# Patient Record
Sex: MALE | Race: WHITE | URBAN - METROPOLITAN AREA
[De-identification: names, ages, dates, MRNs, and addresses within clinical notes are randomized per-mention and may not be internally consistent; named-entity substitution may affect disease eponyms.]

---

## 2022-12-21 ENCOUNTER — APPOINTMENT (RX ONLY)
Dept: URBAN - METROPOLITAN AREA CLINIC 6 | Facility: CLINIC | Age: 52
Setting detail: DERMATOLOGY
End: 2022-12-21

## 2022-12-21 DIAGNOSIS — L82.1 OTHER SEBORRHEIC KERATOSIS: ICD-10-CM

## 2022-12-21 DIAGNOSIS — L91.8 OTHER HYPERTROPHIC DISORDERS OF THE SKIN: ICD-10-CM

## 2022-12-21 DIAGNOSIS — L82.0 INFLAMED SEBORRHEIC KERATOSIS: ICD-10-CM

## 2022-12-21 PROCEDURE — ? COUNSELING

## 2022-12-21 PROCEDURE — ? LIQUID NITROGEN

## 2022-12-21 PROCEDURE — 17110 DESTRUCTION B9 LES UP TO 14: CPT

## 2022-12-21 PROCEDURE — 99202 OFFICE O/P NEW SF 15 MIN: CPT | Mod: 25

## 2022-12-21 ASSESSMENT — LOCATION ZONE DERM
LOCATION ZONE: FACE
LOCATION ZONE: SCALP
LOCATION ZONE: EYELID

## 2022-12-21 ASSESSMENT — LOCATION SIMPLE DESCRIPTION DERM
LOCATION SIMPLE: LEFT SUPERIOR EYELID
LOCATION SIMPLE: SCALP
LOCATION SIMPLE: LEFT CHEEK
LOCATION SIMPLE: SUPERIOR FOREHEAD

## 2022-12-21 ASSESSMENT — LOCATION DETAILED DESCRIPTION DERM
LOCATION DETAILED: LEFT LATERAL SUPERIOR EYELID
LOCATION DETAILED: SUPERIOR MID FOREHEAD
LOCATION DETAILED: LEFT CENTRAL MALAR CHEEK
LOCATION DETAILED: LEFT INFERIOR POSTAURICULAR SKIN

## 2022-12-21 NOTE — PROCEDURE: LIQUID NITROGEN
Spray Paint Text: The liquid nitrogen was applied to the skin utilizing a spray paint frosting technique.
Render Note In Bullet Format When Appropriate: No
Medical Necessity Clause: This procedure was medically necessary because the lesions that were treated were:
Medical Necessity Information: It is in your best interest to select a reason for this procedure from the list below. All of these items fulfill various CMS LCD requirements except the new and changing color options.
Consent: The patient's consent was obtained including but not limited to risks of crusting, scabbing, blistering, scarring, darker or lighter pigmentary change, recurrence, incomplete removal and infection.
Post-Care Instructions: I reviewed with the patient in detail post-care instructions. Patient is to wear sunprotection, and avoid picking at any of the treated lesions. Pt may apply Vaseline to crusted or scabbing areas.
Show Topical Anesthesia Variable?: Yes
Detail Level: Detailed
Number Of Freeze-Thaw Cycles: 3 freeze-thaw cycles

## 2023-07-14 ENCOUNTER — APPOINTMENT (OUTPATIENT)
Dept: URGENT CARE | Facility: PHYSICIAN GROUP | Age: 53
End: 2023-07-14
Payer: COMMERCIAL

## 2023-07-14 ENCOUNTER — OFFICE VISIT (OUTPATIENT)
Dept: URGENT CARE | Facility: PHYSICIAN GROUP | Age: 53
End: 2023-07-14
Payer: COMMERCIAL

## 2023-07-14 VITALS
TEMPERATURE: 98.1 F | HEART RATE: 80 BPM | HEIGHT: 69 IN | SYSTOLIC BLOOD PRESSURE: 136 MMHG | DIASTOLIC BLOOD PRESSURE: 82 MMHG | WEIGHT: 185 LBS | RESPIRATION RATE: 16 BRPM | OXYGEN SATURATION: 97 % | BODY MASS INDEX: 27.4 KG/M2

## 2023-07-14 DIAGNOSIS — S86.002A INJURY OF LEFT ACHILLES TENDON, INITIAL ENCOUNTER: ICD-10-CM

## 2023-07-14 PROCEDURE — 99213 OFFICE O/P EST LOW 20 MIN: CPT | Performed by: NURSE PRACTITIONER

## 2023-07-14 PROCEDURE — 3075F SYST BP GE 130 - 139MM HG: CPT | Performed by: NURSE PRACTITIONER

## 2023-07-14 PROCEDURE — 3079F DIAST BP 80-89 MM HG: CPT | Performed by: NURSE PRACTITIONER

## 2023-07-14 RX ORDER — FLUTICASONE FUROATE AND VILANTEROL 200; 25 UG/1; UG/1
1 POWDER RESPIRATORY (INHALATION)
COMMUNITY
Start: 2023-06-12

## 2023-07-14 RX ORDER — MONTELUKAST SODIUM 10 MG/1
10 TABLET ORAL
COMMUNITY
Start: 2023-06-09

## 2023-07-14 ASSESSMENT — ENCOUNTER SYMPTOMS
MUSCULOSKELETAL NEGATIVE: 1
RESPIRATORY NEGATIVE: 1
CONSTITUTIONAL NEGATIVE: 1
NEUROLOGICAL NEGATIVE: 1
EYES NEGATIVE: 1
GASTROINTESTINAL NEGATIVE: 1
CARDIOVASCULAR NEGATIVE: 1

## 2023-07-14 NOTE — PROGRESS NOTES
"Subjective:   Hosea Noble is a 53 y.o. male who presents for Ankle Injury (Tripped and injured L ankle last night.)      Patient presents with acute left ankle pain and swelling s/p twisting injury while playing with his dog last night.  He states he heard and felt a pop near the insertion of his Achilles tendon and has been unable to bear weight since that time.  He denies numbness or tingling.  He reports radiation of pain up to his calf.         Review of Systems   Constitutional: Negative.    HENT: Negative.     Eyes: Negative.    Respiratory: Negative.     Cardiovascular: Negative.    Gastrointestinal: Negative.    Genitourinary: Negative.    Musculoskeletal: Negative.    Skin: Negative.    Neurological: Negative.        Medications, Allergies, and current problem list reviewed today in Epic.     Objective:     /82   Pulse 80   Temp 36.7 °C (98.1 °F)   Resp 16   Ht 1.753 m (5' 9\")   Wt 83.9 kg (185 lb)   SpO2 97%     Physical Exam  Vitals reviewed.   Constitutional:       Appearance: Normal appearance.   HENT:      Head: Normocephalic and atraumatic.      Nose: Nose normal.      Mouth/Throat:      Mouth: Mucous membranes are moist.      Pharynx: Oropharynx is clear.   Eyes:      Extraocular Movements: Extraocular movements intact.      Conjunctiva/sclera: Conjunctivae normal.      Pupils: Pupils are equal, round, and reactive to light.   Cardiovascular:      Rate and Rhythm: Normal rate and regular rhythm.      Pulses: Normal pulses.      Heart sounds: Normal heart sounds.   Pulmonary:      Effort: Pulmonary effort is normal.      Breath sounds: Normal breath sounds.   Abdominal:      General: Abdomen is flat. Bowel sounds are normal.      Palpations: Abdomen is soft.   Musculoskeletal:      Cervical back: Normal range of motion and neck supple.      Left ankle: Swelling and deformity present. Tenderness present. Decreased range of motion.      Left Achilles Tendon: Tenderness present. Caldwell's " test positive.      Comments: There is swelling and pain to the insertion of the Achilles tendon.  +Caldwell test.  Pain to palpation of the calf muscle with squeezing.  Unable to bear weight.  Neurovascular status intact.  Exam limited due to pain.    Skin:     General: Skin is warm and dry.      Capillary Refill: Capillary refill takes less than 2 seconds.   Neurological:      General: No focal deficit present.      Mental Status: He is alert and oriented to person, place, and time.   Psychiatric:         Mood and Affect: Mood normal.         Behavior: Behavior normal.         Assessment/Plan:     Diagnosis and associated orders:     1. Injury of left Achilles tendon, initial encounter           Comments/MDM:     Suspect patient has an acute partial or complete Achilles tendon rupture. Patient was referred to [facility] this morning, for more appropriate diagnostics and treatment.  He was given crutches for ambulation.  Ankle stabilization was deferred to [facility] as patient will be going there directly from here.  Patient verbalized understanding and is in agreement with this plan.          Differential diagnosis, natural history, supportive care, and indications for immediate follow-up discussed.    Advised the patient to follow-up with the primary care physician for recheck, reevaluation, and consideration of further management.    Please note that this dictation was created using voice recognition software. I have made a reasonable attempt to correct obvious errors, but I expect that there are errors of grammar and possibly content that I did not discover before finalizing the note.    This note was electronically signed by SHYANN Chiu

## 2023-11-01 ENCOUNTER — HOSPITAL ENCOUNTER (OUTPATIENT)
Facility: MEDICAL CENTER | Age: 53
End: 2023-11-01
Attending: PHYSICIAN ASSISTANT
Payer: COMMERCIAL

## 2023-11-01 LAB — PSA SERPL-MCNC: 3.16 NG/ML (ref 0–4)

## 2023-11-01 PROCEDURE — 84270 ASSAY OF SEX HORMONE GLOBUL: CPT

## 2023-11-01 PROCEDURE — 84402 ASSAY OF FREE TESTOSTERONE: CPT

## 2023-11-01 PROCEDURE — 84153 ASSAY OF PSA TOTAL: CPT

## 2023-11-01 PROCEDURE — 84403 ASSAY OF TOTAL TESTOSTERONE: CPT

## 2023-11-03 LAB
SHBG SERPL-SCNC: 12 NMOL/L (ref 19–76)
TESTOST FREE MFR SERPL: 2.7 % (ref 1.6–2.9)
TESTOST FREE SERPL-MCNC: 67 PG/ML (ref 47–244)
TESTOST SERPL-MCNC: 252 NG/DL (ref 300–890)

## 2024-06-25 ENCOUNTER — HOSPITAL ENCOUNTER (OUTPATIENT)
Dept: LAB | Facility: MEDICAL CENTER | Age: 54
End: 2024-06-25
Attending: PHYSICIAN ASSISTANT
Payer: COMMERCIAL

## 2024-06-25 LAB — PSA SERPL-MCNC: 2.23 NG/ML (ref 0–4)

## 2024-06-25 PROCEDURE — 84270 ASSAY OF SEX HORMONE GLOBUL: CPT

## 2024-06-25 PROCEDURE — 36415 COLL VENOUS BLD VENIPUNCTURE: CPT

## 2024-06-25 PROCEDURE — 84402 ASSAY OF FREE TESTOSTERONE: CPT

## 2024-06-25 PROCEDURE — 84403 ASSAY OF TOTAL TESTOSTERONE: CPT

## 2024-06-25 PROCEDURE — 84153 ASSAY OF PSA TOTAL: CPT

## 2024-06-27 LAB
SHBG SERPL-SCNC: 11 NMOL/L (ref 19–76)
TESTOST FREE MFR SERPL: 2.7 % (ref 1.6–2.9)
TESTOST FREE SERPL-MCNC: 39 PG/ML (ref 47–244)
TESTOST SERPL-MCNC: 148 NG/DL (ref 300–890)

## 2024-11-11 ENCOUNTER — RESEARCH ENCOUNTER (OUTPATIENT)
Dept: RESEARCH | Facility: MEDICAL CENTER | Age: 54
End: 2024-11-11

## 2024-11-11 ENCOUNTER — OFFICE VISIT (OUTPATIENT)
Dept: MEDICAL GROUP | Facility: CLINIC | Age: 54
End: 2024-11-11
Payer: COMMERCIAL

## 2024-11-11 VITALS
HEIGHT: 69 IN | TEMPERATURE: 98.5 F | WEIGHT: 210 LBS | DIASTOLIC BLOOD PRESSURE: 81 MMHG | BODY MASS INDEX: 31.1 KG/M2 | HEART RATE: 74 BPM | SYSTOLIC BLOOD PRESSURE: 138 MMHG | RESPIRATION RATE: 18 BRPM | OXYGEN SATURATION: 95 %

## 2024-11-11 DIAGNOSIS — R53.82 CHRONIC FATIGUE: ICD-10-CM

## 2024-11-11 DIAGNOSIS — Z11.4 SCREENING FOR HIV (HUMAN IMMUNODEFICIENCY VIRUS): ICD-10-CM

## 2024-11-11 DIAGNOSIS — Z23 NEED FOR VACCINATION: ICD-10-CM

## 2024-11-11 DIAGNOSIS — J45.30 MILD PERSISTENT ASTHMA WITHOUT COMPLICATION: ICD-10-CM

## 2024-11-11 DIAGNOSIS — Z87.828 HISTORY OF RUPTURE OF ACHILLES TENDON: ICD-10-CM

## 2024-11-11 DIAGNOSIS — Z00.6 RESEARCH STUDY PATIENT: ICD-10-CM

## 2024-11-11 DIAGNOSIS — E66.9 OBESITY (BMI 30-39.9): ICD-10-CM

## 2024-11-11 DIAGNOSIS — Z11.59 NEED FOR HEPATITIS C SCREENING TEST: ICD-10-CM

## 2024-11-11 DIAGNOSIS — Z00.00 HEALTHCARE MAINTENANCE: ICD-10-CM

## 2024-11-11 PROCEDURE — 99203 OFFICE O/P NEW LOW 30 MIN: CPT | Mod: 25,GE

## 2024-11-11 PROCEDURE — 3075F SYST BP GE 130 - 139MM HG: CPT

## 2024-11-11 PROCEDURE — 3079F DIAST BP 80-89 MM HG: CPT

## 2024-11-11 PROCEDURE — 90471 IMMUNIZATION ADMIN: CPT

## 2024-11-11 PROCEDURE — 90715 TDAP VACCINE 7 YRS/> IM: CPT

## 2024-11-11 RX ORDER — TESTOSTERONE GEL, 1% 10 MG/G
50 GEL TRANSDERMAL DAILY
COMMUNITY

## 2024-11-11 ASSESSMENT — PATIENT HEALTH QUESTIONNAIRE - PHQ9: CLINICAL INTERPRETATION OF PHQ2 SCORE: 0

## 2024-11-12 NOTE — RESEARCH NOTE
Patient has been referred by Mark Louis. The initial referral follow-up message, which includes the consent form link, has been sent to the patient.    Eligible Studies: HNP/MNASH

## 2024-11-12 NOTE — ASSESSMENT & PLAN NOTE
Patient reporting chronic fatigue, which he contributes to aging and testosterone insufficiency.  Followed by urology, who is prescribing testosterone gel.  He has lab orders for follow-up with them for January 2025.  Has not had other workup for chronic fatigue.  May be a component of decreased energy with recent weight gain over the past 1 year.  - Will screen for other causes of fatigue: CBC, CMP, TSH, B12/folate

## 2024-11-12 NOTE — ASSESSMENT & PLAN NOTE
Mild persistent asthma, controlled with daily Flonase, Breo inhaler, Zyrtec, Singulair.  Not requiring rescue albuterol for years.  Followed by allergy.  - Continue management per allergy, may prescribe rescue inhaler in the future if requested by patient

## 2024-11-12 NOTE — ASSESSMENT & PLAN NOTE
Achilles tendon rupture 1 year ago, followed by [facility].  Nonsurgical management.  Back to his baseline level activity.  - Continue current exercise regimen

## 2024-11-12 NOTE — PROGRESS NOTES
This note is formatted in an APSO format, for additional subjective and objective evaluation please scroll to the bottom of the note.    CC:  Chief Complaint   Patient presents with    Establish Care       Assessment/Plan:  Problem List Items Addressed This Visit       Chronic fatigue     Patient reporting chronic fatigue, which he contributes to aging and testosterone insufficiency.  Followed by urology, who is prescribing testosterone gel.  He has lab orders for follow-up with them for January 2025.  Has not had other workup for chronic fatigue.  May be a component of decreased energy with recent weight gain over the past 1 year.  - Will screen for other causes of fatigue: CBC, CMP, TSH, B12/folate         Relevant Orders    CBC WITH DIFFERENTIAL    Comp Metabolic Panel    TSH WITH REFLEX TO FT4    VIT B12,  FOLIC ACID    HEMOGLOBIN A1C    Mild persistent asthma without complication     Mild persistent asthma, controlled with daily Flonase, Breo inhaler, Zyrtec, Singulair.  Not requiring rescue albuterol for years.  Followed by allergy.  - Continue management per allergy, may prescribe rescue inhaler in the future if requested by patient         History of rupture of Achilles tendon     Achilles tendon rupture 1 year ago, followed by [facility].  Nonsurgical management.  Back to his baseline level activity.  - Continue current exercise regimen         Obesity (BMI 30-39.9)     Patient with history of obesity, increased weight gain after an Achilles injury 1 year ago.  Has tried dieting, limiting total caloric intake.  Has not tried any weight loss supplements or calorie counting.  Patient interested in Mounjaro for weight loss.  - Engaged in discussion regarding the risks and benefits of Mounjaro  - Recommended continued lifestyle interventions, including limiting caloric intake  - If no improvement in weight loss with conservative measures and OTC supplements in 3 months, may consider prescribing Mounjaro  - Lab  screening to rule out other metabolic complications: CBC, CMP, lipid profile, TSH, A1c         Relevant Orders    CBC WITH DIFFERENTIAL    Comp Metabolic Panel    TSH WITH REFLEX TO FT4    Lipid Profile    HEMOGLOBIN A1C     Other Visit Diagnoses       Screening for HIV (human immunodeficiency virus)        Relevant Orders    HIV AG/AB COMBO ASSAY SCREENING    Need for hepatitis C screening test        Relevant Orders    HEP C VIRUS ANTIBODY    Need for vaccination        Relevant Medications    Zoster Vac Recomb Adjuvanted (SHINGRIX) 50 MCG/0.5ML Recon Susp    Other Relevant Orders    Tdap Vaccine =>6YO IM (Completed)    Healthcare maintenance        Relevant Orders    Referral to Genetic Research Studies              Orders Placed This Encounter    Tdap Vaccine =>6YO IM    HIV AG/AB COMBO ASSAY SCREENING    HEP C VIRUS ANTIBODY    CBC WITH DIFFERENTIAL    Comp Metabolic Panel    TSH WITH REFLEX TO FT4    Lipid Profile    VIT B12,  FOLIC ACID    HEMOGLOBIN A1C    Referral to Genetic Research Studies    testosterone (TESTIM/ANDROGEL) 50 MG/5GM (1%) Gel gel    Zoster Vac Recomb Adjuvanted (SHINGRIX) 50 MCG/0.5ML Recon Susp       Return in about 3 months (around 2/11/2025).    No future appointments.     LABS: Results reviewed and discussed with the patient, questions answered.    HISTORY OF PRESENT ILLNESS: Patient is a 54 y.o. male established patient who presents today with:    Problem   Chronic Fatigue    11/11/2024 Pt reports chronic fatigue, followed by urology and is prescribed flomax and testosterone gel by their clinic. States his testosterone was 150 when checked by their clinic, which is why they initiated testosterone therapy. Next appointment is January 2025.     He attributes his symptoms to aging. Says he injured his achilles tendon one year ago, and during the sedentary stage of his recovery he did gain some weight. Has been trying to lose weight but has been difficult as he is busy with work.       Mild Persistent Asthma Without Complication    11/11/2024 Pt reports history of asthma since age 20, controlled on Breo inhaler daily with albuterol as needed. Has not needed albuterol inhaler for years. Also takes singulair nightly, Zyrtec, and flonase, followed by [facility].     History of Rupture of Achilles Tendon    11/11/2024 Pt reports rupture of achilles tendon one year ago. Gained some weight during the sedentary phase of his recovery, but has gotten back to near his baseline level of activity.      Obesity (Bmi 30-39.9)    11/11/2024 Pt reporting difficulty losing weight, especially due to his work schedule. Has tried restricting caloric intake, limiting red meats and other carbs. Has never tried any other weight loss supplements in the past.  He is inquiring about Mounjaro for weight loss.         1. Chronic fatigue  CBC WITH DIFFERENTIAL    Comp Metabolic Panel    TSH WITH REFLEX TO FT4    VIT B12,  FOLIC ACID    HEMOGLOBIN A1C      2. Mild persistent asthma without complication        3. History of rupture of Achilles tendon        4. Screening for HIV (human immunodeficiency virus)  HIV AG/AB COMBO ASSAY SCREENING      5. Need for hepatitis C screening test  HEP C VIRUS ANTIBODY      6. Need for vaccination  Zoster Vac Recomb Adjuvanted (SHINGRIX) 50 MCG/0.5ML Recon Susp    Tdap Vaccine =>8YO IM      7. Obesity (BMI 30-39.9)  CBC WITH DIFFERENTIAL    Comp Metabolic Panel    TSH WITH REFLEX TO FT4    Lipid Profile    HEMOGLOBIN A1C      8. Healthcare maintenance  Referral to Genetic Research Studies          Patient Active Problem List    Diagnosis Date Noted    Chronic fatigue 11/11/2024    Mild persistent asthma without complication 11/11/2024    History of rupture of Achilles tendon 11/11/2024    Obesity (BMI 30-39.9) 11/11/2024      Allergies:Seasonal    Current Outpatient Medications   Medication Sig Dispense Refill    testosterone (TESTIM/ANDROGEL) 50 MG/5GM (1%) Gel gel Place 50  "mg on the skin every day.      Zoster Vac Recomb Adjuvanted (SHINGRIX) 50 MCG/0.5ML Recon Susp Inject 0.5 mL into the shoulder, thigh, or buttocks one time for 1 dose. 0.5 mL 0    tamsulosin (FLOMAX) 0.4 MG capsule Take 0.4 mg by mouth every day.      fluticasone furoate-vilanterol (BREO) 200-25 MCG/ACT AEROSOL POWDER, BREATH ACTIVATED Inhale 1 Puff every day.      montelukast (SINGULAIR) 10 MG Tab Take 10 mg by mouth at bedtime.       No current facility-administered medications for this visit.       Social History     Tobacco Use    Smoking status: Never     Passive exposure: Never    Smokeless tobacco: Never   Vaping Use    Vaping status: Never Used   Substance Use Topics    Alcohol use: Yes     Alcohol/week: 5.4 oz     Types: 7 Glasses of wine, 2 Shots of liquor per week     Comment: social    Drug use: Never     Social History     Social History Narrative    Lives locally with his wife. Has one child from a prior marriage in [facility]. His wife has 3 children from a prior marriage that also live out of [facility]. Born and raised in [place], moved to [place] in 1996, then to [place] around 2020. 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        Family History   Problem Relation Age of Onset    Prostate enlargement (BPH) Brother        Exam:    /81 (BP Location: Left arm, Patient Position: Sitting, BP Cuff Size: Adult)   Pulse 74   Temp 36.9 °C (98.5 °F) (Temporal)   Resp 18   Ht 1.753 m (5' 9\")   Wt 95.3 kg (210 lb)   SpO2 95%   BMI 31.01 kg/m²  Body mass index is 31.01 kg/m².    Gen: Alert and oriented, No apparent distress. Well developed pleasant male, sitting in office chair.  HEENT: NCAT, MMM  Neck: Supple, FROM  Chest: No deformities, Equal chest expansion  Lungs: Normal effort, CTA bilaterally, no wheezes, rhonchi, or rales  CV: Regular rate and rhythm. No murmurs, rubs, or gallops. Pulse " palpable  Abd: Non-distended  Ext: No clubbing, cyanosis, edema.  Skin: Warm/dry, without rashes  Neuro: A/O x 4, CN 2-12 Grossly intact, Motor/sensory grossly intact  Psych: Normal behavior, normal affect      Mark Louis M.D.   PGY-2  [facility]

## 2024-11-12 NOTE — ASSESSMENT & PLAN NOTE
Patient with history of obesity, increased weight gain after an Achilles injury 1 year ago.  Has tried dieting, limiting total caloric intake.  Has not tried any weight loss supplements or calorie counting.  Patient interested in Mounjaro for weight loss.  - Engaged in discussion regarding the risks and benefits of Mounjaro  - Recommended continued lifestyle interventions, including limiting caloric intake  - If no improvement in weight loss with conservative measures and OTC supplements in 3 months, may consider prescribing Mounjaro  - Lab screening to rule out other metabolic complications: CBC, CMP, lipid profile, TSH, A1c

## 2024-11-18 NOTE — RESEARCH NOTE
Call 1x - Informed patient about unread MyChart msg - Patient indicated that he'll look at it and follow up.

## 2024-11-22 ENCOUNTER — HOSPITAL ENCOUNTER (OUTPATIENT)
Dept: LAB | Facility: MEDICAL CENTER | Age: 54
End: 2024-11-22
Payer: COMMERCIAL

## 2024-11-22 DIAGNOSIS — E66.9 OBESITY (BMI 30-39.9): ICD-10-CM

## 2024-11-22 DIAGNOSIS — Z11.59 NEED FOR HEPATITIS C SCREENING TEST: ICD-10-CM

## 2024-11-22 DIAGNOSIS — R53.82 CHRONIC FATIGUE: ICD-10-CM

## 2024-11-22 DIAGNOSIS — Z11.4 SCREENING FOR HIV (HUMAN IMMUNODEFICIENCY VIRUS): ICD-10-CM

## 2024-11-22 LAB
BASOPHILS # BLD AUTO: 1.7 % (ref 0–1.8)
BASOPHILS # BLD: 0.1 K/UL (ref 0–0.12)
EOSINOPHIL # BLD AUTO: 0.37 K/UL (ref 0–0.51)
EOSINOPHIL NFR BLD: 6.2 % (ref 0–6.9)
ERYTHROCYTE [DISTWIDTH] IN BLOOD BY AUTOMATED COUNT: 44.3 FL (ref 35.9–50)
EST. AVERAGE GLUCOSE BLD GHB EST-MCNC: 114 MG/DL
HBA1C MFR BLD: 5.6 % (ref 4–5.6)
HCT VFR BLD AUTO: 44.5 % (ref 42–52)
HGB BLD-MCNC: 15.5 G/DL (ref 14–18)
IMM GRANULOCYTES # BLD AUTO: 0.05 K/UL (ref 0–0.11)
IMM GRANULOCYTES NFR BLD AUTO: 0.8 % (ref 0–0.9)
LYMPHOCYTES # BLD AUTO: 1.6 K/UL (ref 1–4.8)
LYMPHOCYTES NFR BLD: 26.7 % (ref 22–41)
MCH RBC QN AUTO: 33 PG (ref 27–33)
MCHC RBC AUTO-ENTMCNC: 34.8 G/DL (ref 32.3–36.5)
MCV RBC AUTO: 94.9 FL (ref 81.4–97.8)
MONOCYTES # BLD AUTO: 0.47 K/UL (ref 0–0.85)
MONOCYTES NFR BLD AUTO: 7.8 % (ref 0–13.4)
NEUTROPHILS # BLD AUTO: 3.4 K/UL (ref 1.82–7.42)
NEUTROPHILS NFR BLD: 56.8 % (ref 44–72)
NRBC # BLD AUTO: 0 K/UL
NRBC BLD-RTO: 0 /100 WBC (ref 0–0.2)
PLATELET # BLD AUTO: 237 K/UL (ref 164–446)
PMV BLD AUTO: 9.5 FL (ref 9–12.9)
RBC # BLD AUTO: 4.69 M/UL (ref 4.7–6.1)
WBC # BLD AUTO: 6 K/UL (ref 4.8–10.8)

## 2024-11-22 PROCEDURE — 80061 LIPID PANEL: CPT

## 2024-11-22 PROCEDURE — 83036 HEMOGLOBIN GLYCOSYLATED A1C: CPT

## 2024-11-22 PROCEDURE — 84443 ASSAY THYROID STIM HORMONE: CPT

## 2024-11-22 PROCEDURE — 82746 ASSAY OF FOLIC ACID SERUM: CPT

## 2024-11-22 PROCEDURE — 36415 COLL VENOUS BLD VENIPUNCTURE: CPT

## 2024-11-22 PROCEDURE — 86803 HEPATITIS C AB TEST: CPT

## 2024-11-22 PROCEDURE — 87389 HIV-1 AG W/HIV-1&-2 AB AG IA: CPT

## 2024-11-22 PROCEDURE — 85025 COMPLETE CBC W/AUTO DIFF WBC: CPT

## 2024-11-22 PROCEDURE — 82607 VITAMIN B-12: CPT

## 2024-11-22 PROCEDURE — 80053 COMPREHEN METABOLIC PANEL: CPT

## 2024-11-23 LAB
ALBUMIN SERPL BCP-MCNC: 4.3 G/DL (ref 3.2–4.9)
ALBUMIN/GLOB SERPL: 1.6 G/DL
ALP SERPL-CCNC: 34 U/L (ref 30–99)
ALT SERPL-CCNC: 72 U/L (ref 2–50)
ANION GAP SERPL CALC-SCNC: 12 MMOL/L (ref 7–16)
AST SERPL-CCNC: 33 U/L (ref 12–45)
BILIRUB SERPL-MCNC: 0.3 MG/DL (ref 0.1–1.5)
BUN SERPL-MCNC: 17 MG/DL (ref 8–22)
CALCIUM ALBUM COR SERPL-MCNC: 8.6 MG/DL (ref 8.5–10.5)
CALCIUM SERPL-MCNC: 8.8 MG/DL (ref 8.5–10.5)
CHLORIDE SERPL-SCNC: 106 MMOL/L (ref 96–112)
CHOLEST SERPL-MCNC: 224 MG/DL (ref 100–199)
CO2 SERPL-SCNC: 23 MMOL/L (ref 20–33)
CREAT SERPL-MCNC: 0.82 MG/DL (ref 0.5–1.4)
FASTING STATUS PATIENT QL REPORTED: NORMAL
FOLATE SERPL-MCNC: 8.3 NG/ML
GFR SERPLBLD CREATININE-BSD FMLA CKD-EPI: 103 ML/MIN/1.73 M 2
GLOBULIN SER CALC-MCNC: 2.7 G/DL (ref 1.9–3.5)
GLUCOSE SERPL-MCNC: 104 MG/DL (ref 65–99)
HCV AB SER QL: NORMAL
HDLC SERPL-MCNC: 31 MG/DL
HIV 1+2 AB+HIV1 P24 AG SERPL QL IA: NORMAL
LDLC SERPL CALC-MCNC: 164 MG/DL
POTASSIUM SERPL-SCNC: 4.4 MMOL/L (ref 3.6–5.5)
PROT SERPL-MCNC: 7 G/DL (ref 6–8.2)
SODIUM SERPL-SCNC: 141 MMOL/L (ref 135–145)
TRIGL SERPL-MCNC: 145 MG/DL (ref 0–149)
TSH SERPL DL<=0.005 MIU/L-ACNC: 2.1 UIU/ML (ref 0.38–5.33)
VIT B12 SERPL-MCNC: 1414 PG/ML (ref 211–911)

## 2024-12-05 ENCOUNTER — OFFICE VISIT (OUTPATIENT)
Dept: MEDICAL GROUP | Facility: CLINIC | Age: 54
End: 2024-12-05
Payer: COMMERCIAL

## 2024-12-05 VITALS
RESPIRATION RATE: 20 BRPM | BODY MASS INDEX: 30.51 KG/M2 | WEIGHT: 206 LBS | DIASTOLIC BLOOD PRESSURE: 80 MMHG | HEIGHT: 69 IN | SYSTOLIC BLOOD PRESSURE: 127 MMHG | TEMPERATURE: 98.5 F | OXYGEN SATURATION: 91 % | HEART RATE: 96 BPM

## 2024-12-05 DIAGNOSIS — E78.00 PURE HYPERCHOLESTEROLEMIA: ICD-10-CM

## 2024-12-05 DIAGNOSIS — E66.811 CLASS 1 OBESITY DUE TO EXCESS CALORIES WITH SERIOUS COMORBIDITY AND BODY MASS INDEX (BMI) OF 30.0 TO 30.9 IN ADULT: ICD-10-CM

## 2024-12-05 DIAGNOSIS — Z23 NEED FOR VACCINATION: ICD-10-CM

## 2024-12-05 DIAGNOSIS — E66.09 CLASS 1 OBESITY DUE TO EXCESS CALORIES WITH SERIOUS COMORBIDITY AND BODY MASS INDEX (BMI) OF 30.0 TO 30.9 IN ADULT: ICD-10-CM

## 2024-12-05 PROCEDURE — 99214 OFFICE O/P EST MOD 30 MIN: CPT | Mod: GC

## 2024-12-05 PROCEDURE — 3074F SYST BP LT 130 MM HG: CPT | Mod: GC

## 2024-12-05 PROCEDURE — 3079F DIAST BP 80-89 MM HG: CPT | Mod: GC

## 2024-12-05 RX ORDER — ATORVASTATIN CALCIUM 40 MG/1
40 TABLET, FILM COATED ORAL NIGHTLY
Qty: 30 TABLET | Refills: 5 | Status: SHIPPED | OUTPATIENT
Start: 2024-12-05

## 2024-12-05 ASSESSMENT — FIBROSIS 4 INDEX: FIB4 SCORE: 0.9

## 2024-12-05 NOTE — ASSESSMENT & PLAN NOTE
ASCVD risk 10.9% with LDL of 164.  Patient with comorbid obesity, BMI greater than 30.  Discussed with patient, agreeable to starting atorvastatin at this time.  - Ordered atorvastatin 40 mg daily  - Follow-up for repeat lipid profile in 6 to 12 months, may decrease or discontinue statin dose if significant weight loss is obtained with Zepbound

## 2024-12-06 NOTE — PROGRESS NOTES
This note is formatted in an APSO format, for additional subjective and objective evaluation please scroll to the bottom of the note.    CC:  Chief Complaint   Patient presents with    Lab Results       Assessment/Plan:  Problem List Items Addressed This Visit       Class 1 obesity due to excess calories with serious comorbidity and body mass index (BMI) of 30.0 to 30.9 in adult     Class I obesity with cardiovascular comorbidity of hyperlipidemia, noted on recent lab work 11/22/2024 with cholesterol 224 and , HDL 31.  Patient has been attempting weight loss with formal exercise program x 3 months, limited weight loss of only 4 pounds.  Current weight is 206 pounds with goal weight 169 pounds to get to normal BMI under 25.  Discussed risks and benefits of GLP-1 receptor agonists, and patient would like to proceed.  - Will order Zepbound (tirzepatide) 2.5 mg weekly for 4 weeks, then 5 mg weekly.  Can increase by 2.5 mg increments every 4 weeks up to 15 mg, attempting lowest dose possible to achieve desired weight loss.  - Reviewed [place] formulary, filled out prior authorization form and faxed to the provided number on the [place] form.  Review of online resources reveals [place] likely does not cover tirzepatide at all.  Sent a message to patient in [facility] portal discussing alternative options for obtaining tirzepatide if he requests, such as paying out-of-pocket.         Relevant Medications    Tirzepatide-Weight Management 2.5 MG/0.5ML Solution    Tirzepatide-Weight Management 5 MG/0.5ML Solution (Start on 1/2/2025)    Pure hypercholesterolemia     ASCVD risk 10.9% with LDL of 164.  Patient with comorbid obesity, BMI greater than 30.  Discussed with patient, agreeable to starting atorvastatin at this time.  - Ordered atorvastatin 40 mg daily  - Follow-up for repeat lipid profile in 6 to 12 months, may decrease or discontinue statin dose if significant weight loss is obtained with Zepbound           Relevant Medications    atorvastatin (LIPITOR) 40 MG Tab     Other Visit Diagnoses       Need for vaccination        Relevant Medications    Zoster Vac Recomb Adjuvanted (SHINGRIX) 50 MCG/0.5ML Recon Susp              Orders Placed This Encounter    atorvastatin (LIPITOR) 40 MG Tab    Tirzepatide-Weight Management 2.5 MG/0.5ML Solution    Tirzepatide-Weight Management 5 MG/0.5ML Solution    Zoster Vac Recomb Adjuvanted (SHINGRIX) 50 MCG/0.5ML Recon Susp       Return in about 3 months (around 3/5/2025) for weight loss f/u.    No future appointments.     LABS: Results reviewed and discussed with the patient, questions answered.    HISTORY OF PRESENT ILLNESS: Patient is a 54 y.o. male established patient who presents today with:    Problem   Pure Hypercholesterolemia    12/05/2024 Total cholesterol 224, , HDL 31. ASCVD risk 10.9%.     Class 1 Obesity Due to Excess Calories With Serious Comorbidity and Body Mass Index (Bmi) of 30.0 to 30.9 in Adult    12/05/2024 Since last appointment, has been trying to increase activity at home. Drinking only on weekends. Has been trying regular exercise program, with limited change in weight. Still interested in Tirzepatide for weight loss.    11/11/2024 Pt reporting difficulty losing weight, especially due to his work schedule. Has tried restricting caloric intake, limiting red meats and other carbs. Has never tried any other weight loss supplements in the past.  He is inquiring about Mounjaro for weight loss.         1. Pure hypercholesterolemia  atorvastatin (LIPITOR) 40 MG Tab      2. Need for vaccination  Zoster Vac Recomb Adjuvanted (SHINGRIX) 50 MCG/0.5ML Recon Susp      3. Class 1 obesity due to excess calories with serious comorbidity and body mass index (BMI) of 30.0 to 30.9 in adult  Tirzepatide-Weight Management 2.5 MG/0.5ML Solution    Tirzepatide-Weight Management 5 MG/0.5ML Solution          Patient Active Problem List    Diagnosis Date Noted    Pure  hypercholesterolemia 12/05/2024    Chronic fatigue 11/11/2024    Mild persistent asthma without complication 11/11/2024    History of rupture of Achilles tendon 11/11/2024    Class 1 obesity due to excess calories with serious comorbidity and body mass index (BMI) of 30.0 to 30.9 in adult 11/11/2024      Allergies:Seasonal    Current Outpatient Medications   Medication Sig Dispense Refill    atorvastatin (LIPITOR) 40 MG Tab Take 1 Tablet by mouth every evening. 30 Tablet 5    Tirzepatide-Weight Management 2.5 MG/0.5ML Solution Inject 0.5 mL under the skin every 7 days. 2 mL 0    [START ON 1/2/2025] Tirzepatide-Weight Management 5 MG/0.5ML Solution Inject 0.5 mL under the skin every 7 days. 2 mL 11    Zoster Vac Recomb Adjuvanted (SHINGRIX) 50 MCG/0.5ML Recon Susp Inject 0.5 mL into the shoulder, thigh, or buttocks one time for 1 dose. 0.5 mL 0    testosterone (TESTIM/ANDROGEL) 50 MG/5GM (1%) Gel gel Place 50 mg on the skin every day.      tamsulosin (FLOMAX) 0.4 MG capsule Take 0.4 mg by mouth every day.      fluticasone furoate-vilanterol (BREO) 200-25 MCG/ACT AEROSOL POWDER, BREATH ACTIVATED Inhale 1 Puff every day.      montelukast (SINGULAIR) 10 MG Tab Take 10 mg by mouth at bedtime.       No current facility-administered medications for this visit.       Social History     Tobacco Use    Smoking status: Never     Passive exposure: Never    Smokeless tobacco: Never   Vaping Use    Vaping status: Never Used   Substance Use Topics    Alcohol use: Yes     Alcohol/week: 5.4 oz     Types: 7 Glasses of wine, 2 Shots of liquor per week     Comment: social    Drug use: Never     Social History     Social History Narrative    Lives locally with his wife. Has one child from a prior marriage in [facility]. His wife has 3 children from a prior marriage that also live out of state. Born and raised in [place], moved to [place] in 1996, then to [place] around 2020. 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        Family History   Problem Relation Age of Onset    Prostate enlargement (BPH) Brother        Exam:    /80 (BP Location: Left arm, Patient Position: Sitting, BP Cuff Size: Large adult)   Pulse 96   Temp 36.9 °C (98.5 °F) (Temporal)   Resp 20   Ht 1.753 m (5' 9\")   Wt 93.4 kg (206 lb)   SpO2 91%   BMI 30.42 kg/m²  Body mass index is 30.42 kg/m².    Gen: Alert and oriented, No apparent distress. Well developed pleasant young male, sitting in office chair.  HEENT: NCAT, MMM  Neck: Supple, FROM  Chest: No deformities, Equal chest expansion  Lungs: Normal effort, CTA bilaterally, no wheezes, rhonchi, or rales  CV: Regular rate and rhythm. No murmurs, rubs, or gallops. Pulse palpable  Abd: Non-distended, obese abdomen.  Ext: No clubbing, cyanosis, edema.  Skin: Warm/dry, without rashes  Neuro: A/O x 4, CN 2-12 Grossly intact, Motor/sensory grossly intact  Psych: Normal behavior, normal affect      Mark Louis M.D.   PGY-2  [facility]      "

## 2024-12-06 NOTE — ASSESSMENT & PLAN NOTE
Class I obesity with cardiovascular comorbidity of hyperlipidemia, noted on recent lab work 11/22/2024 with cholesterol 224 and , HDL 31.  Patient has been attempting weight loss with formal exercise program x 3 months, limited weight loss of only 4 pounds.  Current weight is 206 pounds with goal weight 169 pounds to get to normal BMI under 25.  Discussed risks and benefits of GLP-1 receptor agonists, and patient would like to proceed.  - Will order Zepbound (tirzepatide) 2.5 mg weekly for 4 weeks, then 5 mg weekly.  Can increase by 2.5 mg increments every 4 weeks up to 15 mg, attempting lowest dose possible to achieve desired weight loss.  - Reviewed [place] formulary, filled out prior authorization form and faxed to the provided number on the [place] form.  Review of online resources reveals [place] likely does not cover tirzepatide at all.  Sent a message to patient in [employer] portal discussing alternative options for obtaining tirzepatide if he requests, such as paying out-of-pocket.

## 2025-01-28 ENCOUNTER — HOSPITAL ENCOUNTER (OUTPATIENT)
Dept: LAB | Facility: MEDICAL CENTER | Age: 55
End: 2025-01-28
Attending: FAMILY MEDICINE

## 2025-01-28 ENCOUNTER — HOSPITAL ENCOUNTER (OUTPATIENT)
Dept: LAB | Facility: MEDICAL CENTER | Age: 55
End: 2025-01-28
Attending: PHYSICIAN ASSISTANT
Payer: COMMERCIAL

## 2025-01-28 DIAGNOSIS — Z00.6 RESEARCH STUDY PATIENT: ICD-10-CM

## 2025-01-28 LAB
BASOPHILS # BLD AUTO: 1.4 % (ref 0–1.8)
BASOPHILS # BLD: 0.09 K/UL (ref 0–0.12)
EOSINOPHIL # BLD AUTO: 0.42 K/UL (ref 0–0.51)
EOSINOPHIL NFR BLD: 6.5 % (ref 0–6.9)
ERYTHROCYTE [DISTWIDTH] IN BLOOD BY AUTOMATED COUNT: 42.6 FL (ref 35.9–50)
ESTRADIOL SERPL-MCNC: 25.2 PG/ML
HCT VFR BLD AUTO: 46.4 % (ref 42–52)
HGB BLD-MCNC: 16.1 G/DL (ref 14–18)
IMM GRANULOCYTES # BLD AUTO: 0.02 K/UL (ref 0–0.11)
IMM GRANULOCYTES NFR BLD AUTO: 0.3 % (ref 0–0.9)
LYMPHOCYTES # BLD AUTO: 1.94 K/UL (ref 1–4.8)
LYMPHOCYTES NFR BLD: 30 % (ref 22–41)
MCH RBC QN AUTO: 32.1 PG (ref 27–33)
MCHC RBC AUTO-ENTMCNC: 34.7 G/DL (ref 32.3–36.5)
MCV RBC AUTO: 92.6 FL (ref 81.4–97.8)
MONOCYTES # BLD AUTO: 0.43 K/UL (ref 0–0.85)
MONOCYTES NFR BLD AUTO: 6.6 % (ref 0–13.4)
NEUTROPHILS # BLD AUTO: 3.57 K/UL (ref 1.82–7.42)
NEUTROPHILS NFR BLD: 55.2 % (ref 44–72)
NRBC # BLD AUTO: 0 K/UL
NRBC BLD-RTO: 0 /100 WBC (ref 0–0.2)
PLATELET # BLD AUTO: 236 K/UL (ref 164–446)
PMV BLD AUTO: 9.5 FL (ref 9–12.9)
RBC # BLD AUTO: 5.01 M/UL (ref 4.7–6.1)
WBC # BLD AUTO: 6.5 K/UL (ref 4.8–10.8)

## 2025-01-28 PROCEDURE — 82670 ASSAY OF TOTAL ESTRADIOL: CPT

## 2025-01-28 PROCEDURE — 85025 COMPLETE CBC W/AUTO DIFF WBC: CPT

## 2025-01-28 PROCEDURE — 36415 COLL VENOUS BLD VENIPUNCTURE: CPT

## 2025-01-28 PROCEDURE — 84403 ASSAY OF TOTAL TESTOSTERONE: CPT

## 2025-01-29 LAB — TESTOST SERPL-MCNC: 328 NG/DL (ref 175–781)

## 2025-01-31 LAB
ELF SCORE: 8.49 -
HA (HYALURONIC ACID): 32.13 NG/ML
PIIINP (AMINO-TERMINAL PROPEPTIDE): 4.92 NG/ML
RELATIVE RISK: NORMAL
RISK GROUP: NORMAL
RISK: 3.3 %
TIMP-1 (TISSUE INHIBITOR OF MMP1): 188.5 NG/ML

## 2025-02-10 LAB
APOB+LDLR+PCSK9 GENE MUT ANL BLD/T: NOT DETECTED
BRCA1+BRCA2 DEL+DUP + FULL MUT ANL BLD/T: NOT DETECTED
MLH1+MSH2+MSH6+PMS2 GN DEL+DUP+FUL M: NOT DETECTED

## 2025-04-07 ENCOUNTER — APPOINTMENT (OUTPATIENT)
Dept: MEDICAL GROUP | Facility: CLINIC | Age: 55
End: 2025-04-07
Payer: COMMERCIAL

## 2025-04-07 VITALS
HEIGHT: 69 IN | HEART RATE: 76 BPM | RESPIRATION RATE: 16 BRPM | BODY MASS INDEX: 29.89 KG/M2 | SYSTOLIC BLOOD PRESSURE: 127 MMHG | DIASTOLIC BLOOD PRESSURE: 75 MMHG | WEIGHT: 201.8 LBS | TEMPERATURE: 96.9 F | OXYGEN SATURATION: 91 %

## 2025-04-07 DIAGNOSIS — E66.3 OVERWEIGHT (BMI 25.0-29.9): ICD-10-CM

## 2025-04-07 DIAGNOSIS — M79.601 MUSCULOSKELETAL PAIN OF RIGHT UPPER EXTREMITY: ICD-10-CM

## 2025-04-07 PROCEDURE — 3074F SYST BP LT 130 MM HG: CPT | Mod: GC

## 2025-04-07 PROCEDURE — 99214 OFFICE O/P EST MOD 30 MIN: CPT | Mod: GC

## 2025-04-07 PROCEDURE — 3078F DIAST BP <80 MM HG: CPT | Mod: GC

## 2025-04-07 RX ORDER — CYCLOBENZAPRINE HCL 5 MG
5-10 TABLET ORAL 3 TIMES DAILY PRN
Qty: 30 TABLET | Refills: 0 | Status: SHIPPED | OUTPATIENT
Start: 2025-04-07

## 2025-04-07 ASSESSMENT — FIBROSIS 4 INDEX: FIB4 SCORE: 0.92

## 2025-04-07 ASSESSMENT — PATIENT HEALTH QUESTIONNAIRE - PHQ9: CLINICAL INTERPRETATION OF PHQ2 SCORE: 0

## 2025-04-08 NOTE — PROGRESS NOTES
This note is formatted in an APSO format, for additional subjective and objective evaluation please scroll to the bottom of the note.    CC:  Chief Complaint   Patient presents with    Shoulder Pain     Right x5 weeks    Elbow Pain     Right [id number]       Assessment/Plan:  Problem List Items Addressed This Visit       Overweight (BMI 25.0-29.9)    Currently taking tirzepatide, obtained through compounding pharmacy prior to court injunction on 3/19 that limited further prescription. Taking for about 2 weeks now. Has lost about 4 lbs since last appointment. States he has noticed decreased appetite, also trying to maintain healthy lifestyle choices.   - Continue tirzepatide for now, likely will not be able to get approved by insurance but will reattempt in 6 weeks at follow up.          Musculoskeletal pain of right upper extremity    Right shoulder pain for about 5 weeks, worse with activity. No obvious initial injury or prior trauma. Improved with advil and heat. On exam, no joint line tenderness, pain reproducible with palpation at the medial aspect of the right scapula, as well as under the axilla during resisted internal rotation. No muscle weakness, no numbness or tingling.   - Continue conservative management with heat, NSAIDs PRN  - Can try diclofenac gel  - Will try cyclobenzaprine 5-10 mg, pt counseled on risks of drowsiness and instructed to avoid driving after taking medication  - Referral to PT, pt requesting [facility]   - f/u in 6 weeks, consider MRI, steroid injection, and/or ortho referral if not improving         Relevant Medications    cyclobenzaprine (FLEXERIL) 5 mg tablet    Other Relevant Orders    Referral to Physical Therapy         Orders Placed This Encounter    Referral to Physical Therapy    cyclobenzaprine (FLEXERIL) 5 mg tablet       Return in about 6 weeks (around 5/19/2025).    Future Appointments   Date Time Provider Department Center   5/29/2025  2:30 PM Mark Louis M.D. [facility]         LABS: Results reviewed and discussed with the patient, questions answered.    HISTORY OF PRESENT ILLNESS: Patient is a 55 y.o. male established patient who presents today with:    Problem   Musculoskeletal Pain of Right Upper Extremity    04/07/2025 Pain of right shoulder and elbow for about 5 weeks. Began in the shoulder, near medial scapula. Has migrated down to elbow as well. No numbness/tingling, no known injury. Causing trouble sleeping when he lies on the right shoulder. Using advil and heating pad with some relief. Pain worse with lifting. Has done PT in the past for his achilles, enjoyed their group.      Overweight (Bmi 25.0-29.9)    04/07/2025 Was able to obtain tirzepatide through compounding pharmacy prior to the court injunction on 3/19. Has about 6 weeks supply remaining.     12/05/2024 Since last appointment, has been trying to increase activity at home. Drinking only on weekends. Has been trying regular exercise program, with limited change in weight. Still interested in Tirzepatide for weight loss.    11/11/2024 Pt reporting difficulty losing weight, especially due to his work schedule. Has tried restricting caloric intake, limiting red meats and other carbs. Has never tried any other weight loss supplements in the past.  He is inquiring about Mounjaro for weight loss.         1. Musculoskeletal pain of right upper extremity  Referral to Physical Therapy    cyclobenzaprine (FLEXERIL) 5 mg tablet      2. Overweight (BMI 25.0-29.9)            Patient Active Problem List    Diagnosis Date Noted    Musculoskeletal pain of right upper extremity 04/07/2025    Pure hypercholesterolemia 12/05/2024    Chronic fatigue 11/11/2024    Mild persistent asthma without complication 11/11/2024    History of rupture of Achilles tendon 11/11/2024    Overweight (BMI 25.0-29.9) 11/11/2024      Allergies:Seasonal    Current Outpatient Medications   Medication Sig Dispense Refill    cyclobenzaprine (FLEXERIL) 5 mg  "tablet Take 1-2 Tablets by mouth 3 times a day as needed for Muscle Spasms. 30 Tablet 0    Tirzepatide-Weight Management 5 MG/0.5ML Solution Inject 0.5 mL under the skin every 7 days. 2 mL 11    atorvastatin (LIPITOR) 40 MG Tab Take 1 Tablet by mouth every evening. 30 Tablet 5    testosterone (TESTIM/ANDROGEL) 50 MG/5GM (1%) Gel gel Place 50 mg on the skin every day.      tamsulosin (FLOMAX) 0.4 MG capsule Take 0.4 mg by mouth every day.      fluticasone furoate-vilanterol (BREO) 200-25 MCG/ACT AEROSOL POWDER, BREATH ACTIVATED Inhale 1 Puff every day.      montelukast (SINGULAIR) 10 MG Tab Take 10 mg by mouth at bedtime.       No current facility-administered medications for this visit.       Social History     Tobacco Use    Smoking status: Never     Passive exposure: Never    Smokeless tobacco: Never   Vaping Use    Vaping status: Never Used   Substance Use Topics    Alcohol use: Yes     Alcohol/week: 5.4 oz     Types: 7 Glasses of wine, 2 Shots of liquor per week     Comment: social    Drug use: Never     Social History     Social History Narrative    Lives locally with his wife. Has one child from a prior marriage in [facility]. His wife has 3 children from a prior marriage that also live out of state. Born and raised in [place], moved to [place] in 1996, then to [place] around 2020. 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        Family History   Problem Relation Age of Onset    Prostate enlargement (BPH) Brother        Exam:    /75 (BP Location: Left arm, Patient Position: Sitting, BP Cuff Size: Adult)   Pulse 76   Temp 36.1 °C (96.9 °F) (Temporal)   Resp 16   Ht 1.753 m (5' 9\")   Wt 91.5 kg (201 lb 12.8 oz)   SpO2 91%   BMI 29.80 kg/m²  Body mass index is 29.8 kg/m².    Gen: Alert and oriented, No apparent distress. Well developed pleasant young male, sitting in office chair.   HEENT: NCAT, MMM  Neck: Supple, " FROM  Chest: No deformities, Equal chest expansion  Lungs: Normal effort, CTA bilaterally, no wheezes, rhonchi, or rales  CV: Regular rate and rhythm. No murmurs, rubs, or gallops. Pulse palpable  Abd: Non-distended  Ext: FROM, no weakness. Pain with palpation at the right medial scapula as well as over the right axilla during resisted internal rotation of the right shoulder. Negative yergasons, speeds, neers, empty can tests.  Skin: Warm/dry, without rashes  Neuro: A/O x 4, CN 2-12 Grossly intact, Motor/sensory grossly intact  Psych: Normal behavior, normal affect      Mark Louis M.D.   PGY-2  [facility]

## 2025-04-08 NOTE — ASSESSMENT & PLAN NOTE
Right shoulder pain for about 5 weeks, worse with activity. No obvious initial injury or prior trauma. Improved with advil and heat. On exam, no joint line tenderness, pain reproducible with palpation at the medial aspect of the right scapula, as well as under the axilla during resisted internal rotation. No muscle weakness, no numbness or tingling.   - Continue conservative management with heat, NSAIDs PRN  - Can try diclofenac gel  - Will try cyclobenzaprine 5-10 mg, pt counseled on risks of drowsiness and instructed to avoid driving after taking medication  - Referral to PT, pt requesting [facility]   - f/u in 6 weeks, consider MRI, steroid injection, and/or ortho referral if not improving

## 2025-04-08 NOTE — ASSESSMENT & PLAN NOTE
Currently taking tirzepatide, obtained through compounding pharmacy prior to court injunction on 3/19 that limited further prescription. Taking for about 2 weeks now. Has lost about 4 lbs since last appointment. States he has noticed decreased appetite, also trying to maintain healthy lifestyle choices.   - Continue tirzepatide for now, likely will not be able to get approved by insurance but will reattempt in 6 weeks at follow up.

## 2025-04-10 NOTE — Clinical Note
REFERRAL APPROVAL NOTICE         Sent on April 10, 2025                   Hosea Noble  [address] [place] [address]                   Dear Mr. Noble,    After a careful review of the medical information and benefit coverage, [facility] has processed your referral. See below for additional details.    If applicable, you must be actively enrolled with your insurance for coverage of the authorized service. If you have any questions regarding your coverage, please contact your insurance directly.    REFERRAL INFORMATION   Referral #:  [id number]  Referred-To Department    Referred-By Provider:  Physical Therapy    Mark Louis M.D.   [facility]      [address]  [facility] [address]  [phone] [address]  [address]  [facility] [address]  [phone]    Referral Start Date:  04/07/2025  Referral End Date:   04/07/2026             SCHEDULING  If you do not already have an appointment, please call [phone] to make an appointment.     MORE INFORMATION  If you do not already have a [facility] account, sign up at: [email or web address]  You can access your medical information, make appointments, see lab results, billing information, and more.  If you have questions regarding this referral, please contact  the [facility] Referrals department at:             [phone]. Monday - Friday 8:00AM - 5:00PM.     Sincerely,    [facility]

## 2025-05-03 ENCOUNTER — HOSPITAL ENCOUNTER (OUTPATIENT)
Dept: LAB | Facility: MEDICAL CENTER | Age: 55
End: 2025-05-03
Attending: PHYSICIAN ASSISTANT
Payer: COMMERCIAL

## 2025-05-03 LAB
ESTRADIOL SERPL-MCNC: 19.7 PG/ML
HCT VFR BLD AUTO: 47.7 % (ref 42–52)
HGB BLD-MCNC: 16.4 G/DL (ref 14–18)
PSA SERPL DL<=0.01 NG/ML-MCNC: 3.43 NG/ML (ref 0–4)
TESTOST SERPL-MCNC: 275 NG/DL (ref 175–781)

## 2025-05-03 PROCEDURE — 36415 COLL VENOUS BLD VENIPUNCTURE: CPT

## 2025-05-03 PROCEDURE — 85014 HEMATOCRIT: CPT

## 2025-05-03 PROCEDURE — 84153 ASSAY OF PSA TOTAL: CPT

## 2025-05-03 PROCEDURE — 85018 HEMOGLOBIN: CPT

## 2025-05-03 PROCEDURE — 82670 ASSAY OF TOTAL ESTRADIOL: CPT

## 2025-05-03 PROCEDURE — 84403 ASSAY OF TOTAL TESTOSTERONE: CPT

## 2025-05-07 DIAGNOSIS — E66.811 CLASS 1 OBESITY DUE TO EXCESS CALORIES WITH SERIOUS COMORBIDITY AND BODY MASS INDEX (BMI) OF 30.0 TO 30.9 IN ADULT: ICD-10-CM

## 2025-05-07 DIAGNOSIS — E66.09 CLASS 1 OBESITY DUE TO EXCESS CALORIES WITH SERIOUS COMORBIDITY AND BODY MASS INDEX (BMI) OF 30.0 TO 30.9 IN ADULT: ICD-10-CM

## 2025-05-08 NOTE — TELEPHONE ENCOUNTER
Received request via: Patient    Was the patient seen in the last year in this department? Yes    Does the patient have an active prescription (recently filled or refills available) for medication(s) requested? No    Pharmacy Name:   [facility] - [facility] - [address]  [address]  [place] [address]  Phone: [phone] Fax: [phone]        Does the patient have detention Plus and need 100-day supply? (This applies to ALL medications) Patient does not have SCP

## 2025-05-12 DIAGNOSIS — M79.601 MUSCULOSKELETAL PAIN OF RIGHT UPPER EXTREMITY: ICD-10-CM

## 2025-05-12 RX ORDER — CYCLOBENZAPRINE HCL 5 MG
5-10 TABLET ORAL 3 TIMES DAILY PRN
Qty: 30 TABLET | Refills: 0 | Status: SHIPPED | OUTPATIENT
Start: 2025-05-12

## 2025-05-29 ENCOUNTER — APPOINTMENT (OUTPATIENT)
Dept: MEDICAL GROUP | Facility: CLINIC | Age: 55
End: 2025-05-29
Payer: COMMERCIAL

## 2025-06-04 DIAGNOSIS — M79.601 MUSCULOSKELETAL PAIN OF RIGHT UPPER EXTREMITY: ICD-10-CM

## 2025-06-04 RX ORDER — CYCLOBENZAPRINE HCL 5 MG
5-10 TABLET ORAL 3 TIMES DAILY PRN
Qty: 30 TABLET | Refills: 0 | Status: SHIPPED | OUTPATIENT
Start: 2025-06-04 | End: 2025-06-09 | Stop reason: SDUPTHER

## 2025-06-04 NOTE — TELEPHONE ENCOUNTER
Received request via: Pharmacy    Was the patient seen in the last year in this department? Yes    Does the patient have an active prescription (recently filled or refills available) for medication(s) requested? No    Ph[facility] Name: [facility]    Does the patient have jail Plus and need 100-day supply? (This applies to ALL medications) Patient does not have SCP

## 2025-06-05 DIAGNOSIS — E78.00 PURE HYPERCHOLESTEROLEMIA: ICD-10-CM

## 2025-06-05 NOTE — TELEPHONE ENCOUNTER
Received request via: Pharmacy    Was the patient seen in the last year in this department? Yes    Does the patient have an active prescription (recently filled or refills available) for medication(s) requested? No    Pharmacy Name: [id number] - [place], [place] - [address] AT [facility] [address]     Does the patient have alf Plus and need 100-day supply? (This applies to ALL medications) Patient does not have SCP

## 2025-06-09 ENCOUNTER — APPOINTMENT (OUTPATIENT)
Dept: MEDICAL GROUP | Facility: CLINIC | Age: 55
End: 2025-06-09
Payer: COMMERCIAL

## 2025-06-09 VITALS
TEMPERATURE: 97.3 F | DIASTOLIC BLOOD PRESSURE: 77 MMHG | BODY MASS INDEX: 27.88 KG/M2 | HEIGHT: 69 IN | OXYGEN SATURATION: 92 % | SYSTOLIC BLOOD PRESSURE: 124 MMHG | RESPIRATION RATE: 16 BRPM | WEIGHT: 188.2 LBS | HEART RATE: 85 BPM

## 2025-06-09 DIAGNOSIS — E78.00 PURE HYPERCHOLESTEROLEMIA: ICD-10-CM

## 2025-06-09 DIAGNOSIS — M79.601 MUSCULOSKELETAL PAIN OF RIGHT UPPER EXTREMITY: Primary | ICD-10-CM

## 2025-06-09 DIAGNOSIS — E66.3 OVERWEIGHT (BMI 25.0-29.9): ICD-10-CM

## 2025-06-09 PROCEDURE — 3078F DIAST BP <80 MM HG: CPT | Mod: GC

## 2025-06-09 PROCEDURE — 99213 OFFICE O/P EST LOW 20 MIN: CPT | Mod: GE

## 2025-06-09 PROCEDURE — 3074F SYST BP LT 130 MM HG: CPT | Mod: GC

## 2025-06-09 RX ORDER — TRAZODONE HYDROCHLORIDE 50 MG/1
50 TABLET ORAL NIGHTLY
Qty: 30 TABLET | Refills: 3 | Status: SHIPPED | OUTPATIENT
Start: 2025-06-09

## 2025-06-09 RX ORDER — CYCLOBENZAPRINE HCL 5 MG
10 TABLET ORAL 3 TIMES DAILY PRN
Qty: 60 TABLET | Refills: 1 | Status: SHIPPED | OUTPATIENT
Start: 2025-06-09

## 2025-06-09 RX ORDER — TRAZODONE HYDROCHLORIDE 50 MG/1
25 TABLET ORAL NIGHTLY
Qty: 15 TABLET | Refills: 3 | Status: SHIPPED | OUTPATIENT
Start: 2025-06-09 | End: 2025-06-09

## 2025-06-09 RX ORDER — CYCLOBENZAPRINE HCL 5 MG
10 TABLET ORAL 3 TIMES DAILY PRN
Qty: 60 TABLET | Refills: 1 | Status: SHIPPED | OUTPATIENT
Start: 2025-06-09 | End: 2025-06-09

## 2025-06-09 ASSESSMENT — FIBROSIS 4 INDEX: FIB4 SCORE: 0.92

## 2025-06-09 NOTE — ASSESSMENT & PLAN NOTE
Persistent RUE pain at the shoulder, again worse with extreme abduction and internal rotation, grossly unchanged from 2 months ago.  Has been seeing PT at [facility] with little change.  Has MRI scheduled for next week, will see orthopedic surgeon later this week.  Patient interested in surgery if recommended.  - Follow-up with [facility] PT and orthopedic surgery as scheduled  - Refilled Flexeril, 10 mg as needed.  Instructed patient to avoid using in combination with trazodone due to dual sedative effect  - Added trazodone for sleep, will reassess need once pain is better controlled

## 2025-06-10 NOTE — ASSESSMENT & PLAN NOTE
Patient lost significant weight while using tirzepatide obtained from compounding pharmacy.  Tirzepatide was not approved by his insurance.  He will be running out in about 2 weeks.  He states he plans to just maintain lifestyle interventions to avoid regaining the weight once he runs out.  We discussed options for alternative pharmacotherapy, such as Contrave.  He states he would like to see how he does for now.  - Continue current regimen  - Consider generic Contrave approximation at follow-up if starting to regain weight

## 2025-06-17 RX ORDER — ATORVASTATIN CALCIUM 40 MG/1
40 TABLET, FILM COATED ORAL NIGHTLY
Qty: 90 TABLET | Refills: 3 | Status: SHIPPED | OUTPATIENT
Start: 2025-06-17

## 2025-06-17 NOTE — PROGRESS NOTES
This note is formatted in an APSO format, for additional subjective and objective evaluation please scroll to the bottom of the note.    CC:  Chief Complaint   Patient presents with    Follow-Up       Assessment/Plan:  Problem List Items Addressed This Visit       Overweight (BMI 25.0-29.9)    Patient lost significant weight while using tirzepatide obtained from compounding pharmacy.  Tirzepatide was not approved by his insurance.  He will be running out in about 2 weeks.  He states he plans to just maintain lifestyle interventions to avoid regaining the weight once he runs out.  We discussed options for alternative pharmacotherapy, such as Contrave.  He states he would like to see how he does for now.  - Continue current regimen  - Consider generic Contrave approximation at follow-up if starting to regain weight         Relevant Orders    Comp Metabolic Panel    Lipid Profile    HEMOGLOBIN A1C    Pure hypercholesterolemia    Stopped atorvastatin due to GI side effects.  Given recent weight loss on his appetite, desires repeat cholesterol testing prior to restarting any medication.         Relevant Orders    Comp Metabolic Panel    Lipid Profile    HEMOGLOBIN A1C    Musculoskeletal pain of right upper extremity - Primary    Persistent RUE pain at the shoulder, again worse with extreme abduction and internal rotation, grossly unchanged from 2 months ago.  Has been seeing PT at [facility] with little change.  Has MRI scheduled for next week, will see orthopedic surgeon later this week.  Patient interested in surgery if recommended.  - Follow-up with [facility] PT and orthopedic surgery as scheduled  - Refilled Flexeril, 10 mg as needed.  Instructed patient to avoid using in combination with trazodone due to dual sedative effect  - Added trazodone for sleep, will reassess need once pain is better controlled         Relevant Medications    cyclobenzaprine (FLEXERIL) 5 MG tablet         Orders Placed This Encounter    Comp Metabolic  Panel    Lipid Profile    HEMOGLOBIN A1C    DISCONTD: cyclobenzaprine (FLEXERIL) 5 MG tablet    DISCONTD: traZODone (DESYREL) 50 MG Tab    traZODone (DESYREL) 50 MG Tab    cyclobenzaprine (FLEXERIL) 5 MG tablet       Return in about 3 months (around 9/9/2025).    Future Appointments   Date Time Provider Department Center   6/18/2025  1:30 PM [facility]   6/19/2025  3:15 PM Jayce Cohen M.D. [facility]        LABS: Results reviewed and discussed with the patient, questions answered.    HISTORY OF PRESENT ILLNESS: Patient is a 55 y.o. male established patient who presents today with:    Problem   Musculoskeletal Pain of Right Upper Extremity    06/09/2025 Went to [facility], had an appointment with them and has been going to PT. He is scheduled for MRI 6/18 and is scheduled to see an orthopedic surgeon, Dr. Cohen, later this week. He is interested in surgery if recommended.  Has been using Flexeril, 2 tablets at night, along with Advil with some relief.  Also has been using his wife's trazodone, 25 mg, to help him with sleep secondary to his pain.    04/07/2025 Pain of right shoulder and elbow for about 5 weeks. Began in the shoulder, near medial scapula. Has migrated down to elbow as well. No numbness/tingling, no known injury. Causing trouble sleeping when he lies on the right shoulder. Using advil and heating pad with some relief. Pain worse with lifting. Has done PT in the past for his achilles, enjoyed their group.      Pure Hypercholesterolemia    06/09/2025 patient states he has stopped taking atorvastatin due to GI side effects.  Given he has lost 25 pounds on tirzepatide, he would like to retest cholesterol and see if it is necessary to continue medication.     12/05/2024 Total cholesterol 224, , HDL 31. ASCVD risk 10.9%.     Overweight (Bmi 25.0-29.9)    06/09/2025 Has lost 25 pounds since starting tirzepatide. Has about 2 doses left from what he was prescribed from the  [facility].  States once he runs out, he will plan to just try to maintain his weight through lifestyle interventions.    04/07/2025 Was able to obtain tirzepatide through [facility] pharmacy prior to the court injunction on 3/19. Has about 6 weeks supply remaining.     12/05/2024 Since last appointment, has been trying to increase activity at home. Drinking only on weekends. Has been trying regular exercise program, with limited change in weight. Still interested in Tirzepatide for weight loss.    11/11/2024 Pt reporting difficulty losing weight, especially due to his work schedule. Has tried restricting caloric intake, limiting red meats and other carbs. Has never tried any other weight loss supplements in the past.  He is inquiring about Mounjaro for weight loss.         1. Musculoskeletal pain of right upper extremity  cyclobenzaprine (FLEXERIL) 5 MG tablet    DISCONTINUED: cyclobenzaprine (FLEXERIL) 5 MG tablet      2. Overweight (BMI 25.0-29.9)  Comp Metabolic Panel    Lipid Profile    HEMOGLOBIN A1C      3. Pure hypercholesterolemia  Comp Metabolic Panel    Lipid Profile    HEMOGLOBIN A1C          Patient Active Problem List    Diagnosis Date Noted    Musculoskeletal pain of right upper extremity 04/07/2025    Pure hypercholesterolemia 12/05/2024    Chronic fatigue 11/11/2024    Mild persistent asthma without complication 11/11/2024    History of rupture of Achilles tendon 11/11/2024    Overweight (BMI 25.0-29.9) 11/11/2024      Allergies:Seasonal    Current Medications[1]    Social History[2]  Social History     Social History Narrative    Lives locally with his wife. Has one child from a prior marriage in [facility]. His wife has 3 children from a prior marriage that also live out of state. Born and raised in [place], moved to [place] in 1996, then to [place] around 2020. 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   "      Family History   Problem Relation Age of Onset    Prostate enlargement (BPH) Brother        Exam:    /77 (BP Location: Right arm, Patient Position: Sitting, BP Cuff Size: Adult)   Pulse 85   Temp 36.3 °C (97.3 °F) (Temporal)   Resp 16   Ht 1.753 m (5' 9\")   Wt 85.4 kg (188 lb 3.2 oz)   SpO2 92%   BMI 27.79 kg/m²  Body mass index is 27.79 kg/m².    Gen: Alert and oriented, No apparent distress. Well developed. Pleasant young male, sitting in office chair.  Significant weight loss compared to prior  HEENT: NCAT, MMM  Neck: Supple, FROM  Chest: No deformities, Equal chest expansion  Lungs: Normal effort, CTA bilaterally, no wheezes, rhonchi, or rales  CV: Regular rate and rhythm. No murmurs, rubs, or gallops. Pulse palpable  Abd: Non-distended  Ext: Persistent RUE pain at the shoulder, again worse with extreme abduction and internal rotation, grossly unchanged from 2 months ago  Skin: Warm/dry, without rashes  Neuro: A/O x 4, CN 2-12 Grossly intact, Motor/sensory grossly intact  Psych: Normal behavior, normal affect       Mark Louis M.D.   PGY-2  [facility]           [1]   Current Outpatient Medications   Medication Sig Dispense Refill    traZODone (DESYREL) 50 MG Tab Take 1 Tablet by mouth every evening. 30 Tablet 3    cyclobenzaprine (FLEXERIL) 5 MG tablet Take 2 Tablets by mouth 3 times a day as needed for Muscle Spasms. 60 Tablet 1    atorvastatin (LIPITOR) 40 MG Tab Take 1 Tablet by mouth every evening. 90 Tablet 3    Tirzepatide-Weight Management 5 MG/0.5ML Solution Auto-injector Inject 0.5 mL under the skin every 7 days. 2 mL 11    testosterone (TESTIM/ANDROGEL) 50 MG/5GM (1%) Gel gel Place 50 mg on the skin every day.      tamsulosin (FLOMAX) 0.4 MG capsule Take 0.4 mg by mouth every day.      fluticasone furoate-vilanterol (BREO) 200-25 MCG/ACT AEROSOL POWDER, BREATH ACTIVATED Inhale 1 Puff every day.      montelukast (SINGULAIR) 10 MG Tab Take 10 mg by mouth at bedtime.   "     No current facility-administered medications for this visit.   [2]   Social History  Tobacco Use    Smoking status: Never     Passive exposure: Never    Smokeless tobacco: Never   Vaping Use    Vaping status: Never Used   Substance Use Topics    Alcohol use: Not Currently     Alcohol/week: 1.2 oz     Types: 2 Cans of beer per week     Comment: every other night    Drug use: Never

## 2025-06-17 NOTE — ASSESSMENT & PLAN NOTE
Stopped atorvastatin due to GI side effects.  Given recent weight loss on his appetite, desires repeat cholesterol testing prior to restarting any medication.